# Patient Record
Sex: FEMALE | URBAN - METROPOLITAN AREA
[De-identification: names, ages, dates, MRNs, and addresses within clinical notes are randomized per-mention and may not be internally consistent; named-entity substitution may affect disease eponyms.]

---

## 2024-05-30 ENCOUNTER — APPOINTMENT (RX ONLY)
Dept: URBAN - METROPOLITAN AREA CLINIC 55 | Facility: CLINIC | Age: 34
Setting detail: DERMATOLOGY
End: 2024-05-30

## 2024-05-30 DIAGNOSIS — Z41.9 ENCOUNTER FOR PROCEDURE FOR PURPOSES OTHER THAN REMEDYING HEALTH STATE, UNSPECIFIED: ICD-10-CM

## 2024-05-30 PROCEDURE — ? INVENTORY

## 2024-05-30 PROCEDURE — ? COSMETIC CONSULTATION: GENERAL

## 2024-05-30 PROCEDURE — ? IN-HOUSE DISPENSING PHARMACY

## 2024-05-30 NOTE — PROCEDURE: IN-HOUSE DISPENSING PHARMACY
Product 41 Unit Type: mg
Product 56 Amount/Unit (Numbers Only): 0
Product 7 Amount/Unit (Numbers Only): 30
Product 5 Refills: 11
Product 2 Unit Type: ml
Product 7 Application Directions: Apply only as directed
Product 4 Refills: 2
Detail Level: Zone
Name Of Product 3: Acne Triple Combination Gel
Product 6 Application Directions: Apply nightly or as directed. Use SPF daily.
Name Of Product 1: Anti-Aging Brightening Cream
Name Of Product 7: Lidocaine 23% / Tetracaine 7% Cream
Render Refills If Set To 0: Yes
Product 4 Application Directions: Apply nightly or as directed.
Send Charges To Patient Encounter: No
Name Of Product 5: Hydrating Tretinoin 0.025% Cream
Name Of Product 2: Dapsone / Spironolactone Gel
Product 4 Units Dispensed: 1
Name Of Product 6: Rosacea Triple Combination Gel
Name Of Product 4: Hydroquinone 8% Emulsion